# Patient Record
Sex: MALE | Race: BLACK OR AFRICAN AMERICAN | NOT HISPANIC OR LATINO | Employment: FULL TIME | URBAN - METROPOLITAN AREA
[De-identification: names, ages, dates, MRNs, and addresses within clinical notes are randomized per-mention and may not be internally consistent; named-entity substitution may affect disease eponyms.]

---

## 2022-07-04 ENCOUNTER — HOSPITAL ENCOUNTER (EMERGENCY)
Facility: OTHER | Age: 72
Discharge: HOME | End: 2022-07-04
Attending: EMERGENCY MEDICINE
Payer: COMMERCIAL

## 2022-07-04 VITALS
HEART RATE: 57 BPM | HEIGHT: 70 IN | RESPIRATION RATE: 20 BRPM | SYSTOLIC BLOOD PRESSURE: 185 MMHG | TEMPERATURE: 98 F | DIASTOLIC BLOOD PRESSURE: 82 MMHG | BODY MASS INDEX: 25.77 KG/M2 | WEIGHT: 180 LBS | OXYGEN SATURATION: 98 %

## 2022-07-04 DIAGNOSIS — M54.50 ACUTE LEFT-SIDED LOW BACK PAIN WITHOUT SCIATICA: Primary | ICD-10-CM

## 2022-07-04 LAB — HCV AB SERPL QL IA: NEGATIVE

## 2022-07-04 PROCEDURE — 25000003 PHARM REV CODE 250: Performed by: EMERGENCY MEDICINE

## 2022-07-04 PROCEDURE — 86803 HEPATITIS C AB TEST: CPT | Performed by: EMERGENCY MEDICINE

## 2022-07-04 PROCEDURE — 99284 EMERGENCY DEPT VISIT MOD MDM: CPT | Mod: 25

## 2022-07-04 RX ORDER — METHOCARBAMOL 750 MG/1
1500 TABLET, FILM COATED ORAL
Status: COMPLETED | OUTPATIENT
Start: 2022-07-04 | End: 2022-07-04

## 2022-07-04 RX ORDER — LIDOCAINE 50 MG/G
1 PATCH TOPICAL
Status: DISCONTINUED | OUTPATIENT
Start: 2022-07-04 | End: 2022-07-04 | Stop reason: HOSPADM

## 2022-07-04 RX ORDER — DICLOFENAC SODIUM 10 MG/G
2 GEL TOPICAL 3 TIMES DAILY PRN
Qty: 100 G | Refills: 0 | Status: SHIPPED | OUTPATIENT
Start: 2022-07-04

## 2022-07-04 RX ORDER — NAPROXEN 500 MG/1
500 TABLET ORAL 2 TIMES DAILY PRN
Qty: 60 TABLET | Refills: 0 | Status: SHIPPED | OUTPATIENT
Start: 2022-07-04

## 2022-07-04 RX ORDER — LIDOCAINE 50 MG/G
PATCH TOPICAL
Qty: 30 PATCH | Refills: 0 | Status: SHIPPED | OUTPATIENT
Start: 2022-07-04

## 2022-07-04 RX ORDER — METHOCARBAMOL 750 MG/1
1500 TABLET, FILM COATED ORAL 3 TIMES DAILY PRN
Qty: 30 TABLET | Refills: 0 | Status: SHIPPED | OUTPATIENT
Start: 2022-07-04 | End: 2022-07-09

## 2022-07-04 RX ORDER — NAPROXEN 500 MG/1
500 TABLET ORAL
Status: COMPLETED | OUTPATIENT
Start: 2022-07-04 | End: 2022-07-04

## 2022-07-04 RX ADMIN — NAPROXEN 500 MG: 500 TABLET ORAL at 04:07

## 2022-07-04 RX ADMIN — LIDOCAINE 1 PATCH: 50 PATCH CUTANEOUS at 04:07

## 2022-07-04 RX ADMIN — METHOCARBAMOL 1500 MG: 750 TABLET ORAL at 04:07

## 2022-07-04 NOTE — DISCHARGE INSTRUCTIONS
Thank you for letting us take care of you today! It was nice meeting you, and I hope you feel better soon.     Call your primary care doctor to make the first available appointment.     Keep all your medical appointments.     Take your regular medication as prescribed. Contact your primary care provider before running out of medication, or for any problems obtaining them.    Do not drive or operate heavy machinery while taking opioid or muscle relaxing medications. Examples include norco, percocet, xanax, valium, flexeril.     Overuse or long term use of pain and sedating medication may lead to addiction, dependence, organ failure, family and work problems, legal problems, accidental overdose and death.    If you do not have health insurance, you probably can afford it:  Call [phone] ([facility], [facility] hotline) or go to www.[employer].[place].gov    Your evaluation in the ED does not suggest any emergent or life threatening medical condition requiring admission or immediate intervention beyond that provided in the ED.   However, the signs of a serious problem sometimes take more time to appear.     Do not hesitate to return to the ER if any of the following occur:    Weakness, dizziness, fainting, or loss of consciousness   Fever of 100.4ºF (38ºC) or higher  Any worse symptoms  Any new or concerning symptoms    Sincerely,   Siddharth Ernandez MD  Board Certified Emergency Physician    To protect yourself and others from COVID19:  Get vaccinated.   Anyone over 5 years old is eligible for vaccination.   Everyone 18 and older should get 3 total vaccine doses. Anyone over 50 years old or with certain chronic conditions should get a 4th dose.   Vaccination is shown to prevent getting sick, ending up in the hospital, or dying because of COVID19.   If you are vaccinated, help friends and family get the vaccine.    If not vaccinated:  Your shot is waiting for you. To get it:   Text your ZIP code to GETVAX ([id number]) or VACUNA ([id number]) in  [place]  call 311, or [phone], or [phone], or [phone],   go to www.vaccines.gov, or  Call your health provider  If exposed to someone with cold, flu, or COVID19 symptoms, you must quarantine for at least 5 days.   Even if you have no symptoms   Otherwise you could give the virus to someone who dies from it  Some symptoms of COVID19 include fever, cough, sore throat, breathing troubles, loss of taste/smell, headaches, stomach upset, diarrhea.   Each household can get 4 free COVID19 test kits, even if you already got one set. Go to www.covidtests.gov    You were seen for your back pain.  At this time, it does not appear your pain is from a dangerous cause.     You have injured the muscles (strain) or ligaments (sprain) around the spine. Muscle spasm is often present and adds to the pain.     Do your activities as tolerated. Bedrest will probably make your back pain worse.  Take NSAIDs regularly over the next 1-2 days. Do not exceed the maximum recommended daily dose.     Take all your medications exactly as prescribed.  Call your primary care provider to make the first available appointment.     A back sprain or muscle strain usually gets better in 2-3 weeks. If pain continues and does not respond to medical treatment after 3-4 weeks contact your primary care doctor or return to the ER.     Do not drive or operate heavy machinery while taking valium, lortab, percocet or other sedating medications. Prolonged or overuse of drugs prescribed for pain, sedation or muscle relaxation may lead to addiction, dependence, family problems, legal problems, organ failure, death.      RETURN TO THE ER if any of the following occur:    Pain becomes worse or spreads into your arms or legs   Weakness, numbness or pain in one or both arms or legs   Loss of bowel or bladder control   Numbness in the groin area   Difficulty walking  New or worse pain: if it feels different, becomes more severe, lasts longer, or begins to  spread into your shoulder, arm, neck, jaw or back   Shortness of breath or increased pain with breathing   Cough with dark colored sputum (phlegm) or blood   Weakness, dizziness, fainting, falling out, or loss of consciousness   Fever of 100.4ºF (38ºC) or higher  Any new or concerning symptoms

## 2022-07-04 NOTE — ED PROVIDER NOTES
"  Source of History:  Medical record, patient.    Chief complaint:  Per triage note: "Back Pain (Lower back pain radiating to left hip & around to thigh for the past 5 days; doesn't recall any recent trauma)  "    HPI:    Patient presents to the ED with complaint of left-sided lower back pain for the past 5 days. He states his pain radiates down his left hip into his left leg. He denies any shortness of breath, leg edema, fever, or urinary symptoms. He states he has taken Advil for his pain with little relief. He states he attempted to close his car door with his shoulder today because his hands were full and he noticed a sharp pain. Of note, he reports traveling by train from [place] to [place] and then driving from [place] to [place] within the past week.    This is the extent of the patient's complaints at this time.     ROS:   As per HPI and below:   General: No fever.   HENT: No facial pain.  Eyes: No eye pain.   Cardiovascular: No chest pain.   Respiratory: No dyspnea.   GI: No abdominal pain. No n/v. No stool incontinence/retention.   : No urine incontinence/retention.  Skin: No rashes.  Neuro: No saddle anesthesia. No focal strength or sensation deficits.   Musculoskeletal: Notes back pain. No swelling.    All other systems negative.         Review of patient's allergies indicates:   Allergen Reactions    Sulfa (sulfonamide antibiotics)        PMH:  As per HPI and below:  Past Medical History:   Diagnosis Date    Hypertension        Past Surgical History:   Procedure Laterality Date    CARDIAC CATHETERIZATION      KNEE SURGERY      ROTATOR CUFF REPAIR         Social History     Tobacco Use    Smoking status: Never Smoker    Smokeless tobacco: Never Used   Substance Use Topics    Alcohol use: Yes     Comment: occ    Drug use: Never       Physical Exam:      Nursing note and vitals reviewed.  BP (!) 174/81 (BP Location: Left arm, Patient Position: Standing)   Pulse (!) 55   Temp 98.1 °F (36.7 " "°C) (Oral)   Resp 18   Ht 5' 10" (1.778 m)   Wt 81.6 kg (180 lb)   SpO2 96%   BMI 25.83 kg/m²      Constitutional: AAOx3. Well-developed and well-nourished. No distress.  HENT:   Mouth/Throat: Oropharynx is clear and moist.  Eyes: Pupils are equal, round, and reactive to light. No discharge. Anicteric.  Neck: Normal range of motion. Neck supple. No midline tenderness, stepoffs, or deformities.  Cardiovascular: Normal rate.  Pulmonary/Chest: Effort normal.  Abdominal: Soft. Bowel sounds normal. No distension and no mass. There is no tenderness. There is no rebound, no guarding.  Musculoskeletal: Normal range of motion. No midline spinal tenderness. No stepoffs or deformities. Left lumbar paraspinal tenderness and spasm.   Neurological: Alert and oriented to person, place, and time. No gross cranial nerve deficit. Coordination normal. No UE/LE light  touch or strength deficits. Able to do deep knee bend, stand on heels and toes. Normal gait.   Skin: Skin is warm and dry.   Ext: 2+ radial pulses  Psychiatric: Behavior is normal. Judgment normal.    MDM:    I decided to obtain the patient's medical records.    ED Course as of 07/04/22 2310   Mon Jul 04, 2022   1620 MDM:  Daryl Perez is a 71 y.o. male with HTN presents with CC low back pain. No bowel/bladder incontinence/retention, no weakness, ambulating normally. Denies fevers, chills, nightsweats, LE weakness or paresthesias, saddle anesthesia, DM Hx, recent procedure, or trauma.     My initial differential diagnosis included ruptured AAA, epidural abscess, cauda equina syndrome, fracture, disc herniation, sciatica, and musculoskeletal back pain.     Exam with no stepoff/deformity to lower lumbar spine, neuro intact, no abdominal mass, gait intact, no signs of infection and appears nontoxic. He denies chest pain, dyspnea, leg edema, or leg pain.   At present no evidence of cauda equina, acute neurologic compromise, or AAA. I doubt DVT and acute PE. By history " and physical exam, I believe the patient has muscle spasm with lumbar strain.  I will provide anti-inflammatories and muscle relaxants.  [RC]      ED Course User Index  [RC] Siddharth Ernandez MD       Medications   naproxen tablet 500 mg (has no administration in time range)   methocarbamoL tablet 1,500 mg (has no administration in time range)   LIDOcaine 5 % patch 1 patch (has no administration in time range)              I, Shine Zaman, scribed for, and in the presence of, Dr. Ernandez. I performed the scribed service and the documentation accurately describes the services I performed. I attest to the accuracy of the note.     Physician Attestation for Scribe:   I, Siddharth Ernandez MD, reviewed documentation as scribed in my presence, which is both accurate and complete.    Diagnostic Impression:    1. Acute left-sided low back pain without sciatica         ED Disposition Condition    Discharge Good        ED Prescriptions     Medication Sig Dispense Start Date End Date Auth. Provider    naproxen (NAPROSYN) 500 MG tablet Take 1 tablet (500 mg total) by mouth 2 (two) times daily as needed (pain). 60 tablet 7/4/2022  Siddharth Ernandez MD    LIDOcaine (LIDODERM) 5 % Apply to affected area as needed for pain for 12 hours, then off for 12 hours. Discard after each use.  May use 4% lidocaine patch as alternative. 30 patch 7/4/2022  Siddharth Ernandez MD    diclofenac sodium (VOLTAREN) 1 % Gel Apply 2 g topically 3 (three) times daily as needed (muscle spasm pain). Apply 2 grams to affected area 3 times daily as needed 100 g 7/4/2022  Siddharth Ernandez MD    methocarbamoL (ROBAXIN) 750 MG Tab Take 2 tablets (1,500 mg total) by mouth 3 (three) times daily as needed (Muscle spasm pain). 30 tablet 7/4/2022 7/9/2022 Siddharth Ernandez MD        Follow-up Information    None              Siddharth Ernandez MD  07/04/22 2779

## 2022-07-04 NOTE — ED TRIAGE NOTES
72 y/o male presents with c/o of L lower back pain radiating down to L hip starting last Tuesday. Recently drove here from [place] and states he believed that irritated his back.

## 2022-07-07 ENCOUNTER — HOSPITAL ENCOUNTER (EMERGENCY)
Facility: OTHER | Age: 72
Discharge: HOME | End: 2022-07-07
Attending: EMERGENCY MEDICINE
Payer: COMMERCIAL

## 2022-07-07 VITALS
HEIGHT: 70 IN | RESPIRATION RATE: 18 BRPM | TEMPERATURE: 99 F | WEIGHT: 180 LBS | SYSTOLIC BLOOD PRESSURE: 139 MMHG | BODY MASS INDEX: 25.77 KG/M2 | OXYGEN SATURATION: 100 % | DIASTOLIC BLOOD PRESSURE: 68 MMHG | HEART RATE: 68 BPM

## 2022-07-07 DIAGNOSIS — M54.50 ACUTE LEFT-SIDED LOW BACK PAIN WITHOUT SCIATICA: Primary | ICD-10-CM

## 2022-07-07 PROCEDURE — 63600175 PHARM REV CODE 636 W HCPCS: Performed by: EMERGENCY MEDICINE

## 2022-07-07 PROCEDURE — 96372 THER/PROPH/DIAG INJ SC/IM: CPT | Performed by: EMERGENCY MEDICINE

## 2022-07-07 PROCEDURE — 99284 EMERGENCY DEPT VISIT MOD MDM: CPT | Mod: 25

## 2022-07-07 RX ORDER — HYDROCODONE BITARTRATE AND ACETAMINOPHEN 5; 325 MG/1; MG/1
1 TABLET ORAL EVERY 6 HOURS PRN
Qty: 12 TABLET | Refills: 0 | Status: SHIPPED | OUTPATIENT
Start: 2022-07-07

## 2022-07-07 RX ORDER — GABAPENTIN 100 MG/1
100 CAPSULE ORAL 3 TIMES DAILY PRN
Qty: 21 CAPSULE | Refills: 0 | Status: SHIPPED | OUTPATIENT
Start: 2022-07-07 | End: 2023-07-07

## 2022-07-07 RX ORDER — KETOROLAC TROMETHAMINE 30 MG/ML
15 INJECTION, SOLUTION INTRAMUSCULAR; INTRAVENOUS
Status: COMPLETED | OUTPATIENT
Start: 2022-07-07 | End: 2022-07-07

## 2022-07-07 RX ORDER — DEXAMETHASONE SODIUM PHOSPHATE 4 MG/ML
4 INJECTION, SOLUTION INTRA-ARTICULAR; INTRALESIONAL; INTRAMUSCULAR; INTRAVENOUS; SOFT TISSUE
Status: COMPLETED | OUTPATIENT
Start: 2022-07-07 | End: 2022-07-07

## 2022-07-07 RX ADMIN — KETOROLAC TROMETHAMINE 15 MG: 30 INJECTION, SOLUTION INTRAMUSCULAR; INTRAVENOUS at 08:07

## 2022-07-07 RX ADMIN — DEXAMETHASONE SODIUM PHOSPHATE 4 MG: 4 INJECTION INTRA-ARTICULAR; INTRALESIONAL; INTRAMUSCULAR; INTRAVENOUS; SOFT TISSUE at 08:07

## 2022-07-08 NOTE — DISCHARGE INSTRUCTIONS
Please take gabapentin only at night due to side effects of sedation, and not at the same time as pain medication and Norco or muscle relaxant methocarbamol, as they all can cause sedation.

## 2022-07-08 NOTE — ED PROVIDER NOTES
"Encounter Date: 7/7/2022    SCRIBE #1 NOTE: I, Rubia Fry, am scribing for, and in the presence of, Gabriel Diaz MD.       History     Chief Complaint   Patient presents with    sciatica pain     To ed with c/o lower back pain radiating into L hip and anterior LLE since Monday. Pt was seen here and dcd. Taking naprosyn and muscle relaxers at home without relief.      Time seen by provider: 8:20 PM    This is a 71 y.o. male with a PMHx of hypertension and chronic back pain who presents with complaint of left lower back pain. The patient states the pain started 5 days ago after he drove from [place] to [facility]. He was seen here 3 days ago and prescribed Naproxen and a muscle relaxer, which he has been taking with no relief. The patient states the "stinging and sharp" pain radiates from his lower left back down to his left knee. Movement worsens the pain. He has experienced similar pain before, usually on his right side, but the pain has never been this severe. He typically takes aleve with relief. The patient denies weakness or numbness. He also denies any abnormal urination. This is the extent of the patient's complaints at this time.    The history is provided by the patient.     Review of patient's allergies indicates:   Allergen Reactions    Sulfa (sulfonamide antibiotics)      Past Medical History:   Diagnosis Date    Hypertension      Past Surgical History:   Procedure Laterality Date    CARDIAC CATHETERIZATION      KNEE SURGERY      ROTATOR CUFF REPAIR       History reviewed. No pertinent family history.  Social History     Tobacco Use    Smoking status: Never Smoker    Smokeless tobacco: Never Used   Substance Use Topics    Alcohol use: Yes     Comment: occ    Drug use: Never     Review of Systems   Constitutional: Negative for fever.   HENT: Negative for sore throat.    Respiratory: Negative for shortness of breath.    Cardiovascular: Negative for chest pain.   Gastrointestinal: Negative " for nausea.   Genitourinary: Negative for difficulty urinating, dysuria, frequency and hematuria.   Musculoskeletal: Positive for back pain.        +LLE pain.   Skin: Negative for rash.   Neurological: Negative for weakness and numbness.   Hematological: Does not bruise/bleed easily.       Physical Exam     Initial Vitals [07/07/22 1841]   BP Pulse Resp Temp SpO2   127/62 62 18 98.4 °F (36.9 °C) 100 %      MAP       --         Physical Exam    Nursing note and vitals reviewed.  Constitutional: He appears well-developed and well-nourished. He is not diaphoretic. No distress.   HENT:   Head: Normocephalic and atraumatic.   Eyes: Conjunctivae are normal.   Neck: Neck supple.   Cardiovascular: Normal rate, regular rhythm, normal heart sounds and intact distal pulses.   No murmur heard.  Pulmonary/Chest: Breath sounds normal. No respiratory distress. He has no wheezes. He has no rhonchi. He has no rales.   Abdominal: Abdomen is soft. There is no abdominal tenderness. There is no rebound and no guarding.   Musculoskeletal:         General: No edema. Normal range of motion.      Cervical back: Neck supple.      Comments: No focal L-spine tenderness. Negative straight left leg raise.     Neurological: He is alert and oriented to person, place, and time. He has normal strength. No cranial nerve deficit or sensory deficit.   Skin: Skin is warm and dry.   Psychiatric: He has a normal mood and affect.         ED Course   Procedures  Labs Reviewed - No data to display       Imaging Results    None          Medications   ketorolac injection 15 mg (15 mg Intramuscular Given 7/7/22 2042)   dexamethasone injection 4 mg (4 mg Intramuscular Given 7/7/22 2042)     Medical Decision Making:   History:   Old Medical Records: I decided to obtain old medical records.  Initial Assessment:       71-year-old male with history of hypertension presents to the ED for evaluation of worsening left-sided low back pain.  He had initial onset after  driving here from [place] about 5 days ago, with left-sided low back pain that radiated to his left thigh but not below his knee.  He was seen here 3 days ago and prescribed naproxen, Robaxin; he has been taking those but not the lidocaine patch and Voltaren gel that the pharmacy was unable to fill.  Despite taking naproxen b.i.d. and Robaxin t.i.d. he has had worsening pain radiating to his left thigh, though no new injury or trauma.  He denies any associated leg weakness or numbness, no incontinence.  He has had low back pain before, including previous episode of right back pain radiating down the back of his leg diagnosed as sciatica, but he has never needed more than Aleve for his pain or ever has seen a back specialist or had an MRI.    On exam patient with no midline spine tenderness, and is neuro intact with no left leg weakness or numbness.  No sign of spinal cord compression or fracture, no indication for emergent imaging.  Most likely lumbar radiculopathy involving L4 or L5 and likely disc herniation.  Unlikely to be muscle strain and spasm since no tenderness to paraspinal muscles and no improvement with initial NSAIDs/muscle relaxants.  Given worsening pain will add 1 time steroid shot now as well as Toradol, and patient advised to continue NSAIDs, will also Rx gabapentin which she will only take at night due to sedative side effects, and Norco for severe pain.  Patient understands not to take these medications at the same time, and to not take muscle relaxant anymore since there is no clear muscle spasm.  He will follow up with his PCP when he returns home for urgent MRI and return to the ED for any worsening symptoms or other concerns before then.              Scribe Attestation:   Scribe #1: I performed the above scribed service and the documentation accurately describes the services I performed. I attest to the accuracy of the note.              I, Dr. Gabriel Daiz, personally performed the  services described in this documentation. All medical record entries made by the scribe were at my direction and in my presence.  I have reviewed the chart and agree that the record reflects my personal performance and is accurate and complete. Gabriel Diaz MD.       Clinical Impression:   Final diagnoses:  [M54.50] Acute left-sided low back pain without sciatica (Primary)          ED Disposition Condition    Discharge Stable        ED Prescriptions     Medication Sig Dispense Start Date End Date Auth. Provider    HYDROcodone-acetaminophen (NORCO) 5-325 mg per tablet Take 1 tablet by mouth every 6 (six) hours as needed for Pain. 12 tablet 7/7/2022  Gabriel Diaz MD    gabapentin (NEURONTIN) 100 MG capsule Take 1 capsule (100 mg total) by mouth 3 (three) times daily as needed (Nerve pain). 21 capsule 7/7/2022 7/7/2023 Gabriel Diaz MD        Follow-up Information    None          Gabriel Diaz MD  07/08/22 0218

## 2022-07-08 NOTE — ED TRIAGE NOTES
Pt presents to the ED c/o lower back pain. Pt reports pain in the lower left hip to lower left leg that he was seen in this ED for approx x3 days ago. Reports being unable to obtain prescribed medications in that time and that the pain has returned. Denies any other complaints at this time. AAOx4

## 2025-03-06 ENCOUNTER — HOSPITAL ENCOUNTER (EMERGENCY)
Facility: HOSPITAL | Age: 75
Discharge: HOME | End: 2025-03-06
Payer: MEDICARE

## 2025-03-06 VITALS
OXYGEN SATURATION: 97 % | SYSTOLIC BLOOD PRESSURE: 152 MMHG | HEART RATE: 56 BPM | RESPIRATION RATE: 18 BRPM | TEMPERATURE: 98 F | DIASTOLIC BLOOD PRESSURE: 66 MMHG

## 2025-03-06 DIAGNOSIS — H11.32 SUBCONJUNCTIVAL HEMORRHAGE OF LEFT EYE: Primary | ICD-10-CM

## 2025-03-06 PROCEDURE — 99282 EMERGENCY DEPT VISIT SF MDM: CPT

## 2025-03-06 NOTE — DISCHARGE INSTRUCTIONS
Please follow-up with ophthalmology in the next 3-7 days return to ER for any worsening or concerning symptoms

## 2025-03-06 NOTE — ED PROVIDER NOTES
Encounter Date: 3/6/2025       History     Chief Complaint   Patient presents with    Eye Pain     Pt arrives to the ed with c/o redness to the conjunctiva x 3 days. Denies pain, trauma, injury, and changes in vision.      This note is dictated on M*Modal word recognition program.  There are word recognition mistakes and grammatical errors that are occasionally missed on review.     Daryl Perez is a 74 y.o. male with a history of MI in the past, cardiac catheterization, hypertension presents to ER today with complaints of left eye redness for the last 3 days.  Patient reports he woke up 3 days ago and his left eye was red in the mirror, denies eye pain denies blurred vision denies eye itching denies eye discharge.  The patient denies any eye injury or trauma.      The history is provided by the patient.     Review of patient's allergies indicates:   Allergen Reactions    Sulfa (sulfonamide antibiotics)      Past Medical History:   Diagnosis Date    Hypertension      Past Surgical History:   Procedure Laterality Date    CARDIAC CATHETERIZATION      KNEE SURGERY      ROTATOR CUFF REPAIR       No family history on file.  Social History[1]  Review of Systems   Constitutional: Negative.    HENT: Negative.     Eyes:  Positive for redness. Negative for visual disturbance.   Respiratory: Negative.     Cardiovascular: Negative.    Gastrointestinal: Negative.    Endocrine: Negative.    Musculoskeletal: Negative.    Skin: Negative.    Allergic/Immunologic: Negative.    Neurological: Negative.    Hematological: Negative.    Psychiatric/Behavioral: Negative.     All other systems reviewed and are negative.      Physical Exam     Initial Vitals [03/06/25 1306]   BP Pulse Resp Temp SpO2   (!) 152/66 (!) 56 18 97.8 °F (36.6 °C) 97 %      MAP       --         Physical Exam    Constitutional: He appears well-developed and well-nourished.   Eyes: Conjunctivae and EOM are normal. Pupils are equal, round, and reactive to light. Right  eye exhibits no discharge. Left eye exhibits no discharge. No scleral icterus.   Patient has subconjunctival hemorrhage to left eye examination   Neck:   Normal range of motion.  Cardiovascular:  Normal rate and regular rhythm.           No murmur heard.  Pulmonary/Chest: Breath sounds normal. No respiratory distress.   Abdominal: Abdomen is soft. Bowel sounds are normal. He exhibits no distension. There is no abdominal tenderness. There is no rebound and no guarding.   Musculoskeletal:         General: No tenderness or edema.      Cervical back: Normal range of motion.     Neurological: He is alert and oriented to person, place, and time. GCS score is 15. GCS eye subscore is 4. GCS verbal subscore is 5. GCS motor subscore is 6.   Skin: Skin is warm. Capillary refill takes less than 2 seconds. No rash noted. No erythema.   Psychiatric: He has a normal mood and affect. His behavior is normal. Judgment and thought content normal.         ED Course   Procedures  Labs Reviewed - No data to display       Imaging Results    None          Medications - No data to display  Medical Decision Making  Differential diagnosis includes subconjunctival hemorrhage, conjunctivitis, scleritis    Physical exam consistent with subconjunctival hemorrhage of left eye.  Patient's visual acuity 20/20 all around.  Vital signs stable at discharge patient instructed to follow-up with eye doctor as soon as possible.  Patient stable at time of discharge in no acute distress.  No life-threatening illnesses were found during ER visit today.  Patient was instructed to follow-up with PCP or other recommended specialist within the next 48-72 hours.  Patient was instructed to return to ER immediately for any worsening or concerning symptoms.  All discharge instructions discussed with patient, and patient agrees to comply with discharge instructions given today.                                       Clinical Impression:  Final diagnoses:  [H11.32]  Subconjunctival hemorrhage of left eye (Primary)          ED Disposition Condition    Discharge Stable          ED Prescriptions    None       Follow-up Information    None            [1]   Social History  Tobacco Use    Smoking status: Never    Smokeless tobacco: Never   Substance Use Topics    Alcohol use: Yes     Comment: occ    Drug use: Never        Peter Kumar, NP  03/06/25 1239